# Patient Record
Sex: MALE | Race: WHITE | Employment: OTHER | URBAN - NONMETROPOLITAN AREA
[De-identification: names, ages, dates, MRNs, and addresses within clinical notes are randomized per-mention and may not be internally consistent; named-entity substitution may affect disease eponyms.]

---

## 2019-06-28 ENCOUNTER — OFFICE VISIT (OUTPATIENT)
Dept: UROLOGY | Age: 64
End: 2019-06-28
Payer: COMMERCIAL

## 2019-06-28 VITALS
WEIGHT: 242.3 LBS | BODY MASS INDEX: 33.92 KG/M2 | SYSTOLIC BLOOD PRESSURE: 110 MMHG | DIASTOLIC BLOOD PRESSURE: 68 MMHG | HEIGHT: 71 IN

## 2019-06-28 DIAGNOSIS — N40.0 BPH WITHOUT OBSTRUCTION/LOWER URINARY TRACT SYMPTOMS: ICD-10-CM

## 2019-06-28 DIAGNOSIS — R36.1 HEMATOSPERMIA: Primary | ICD-10-CM

## 2019-06-28 LAB
BILIRUBIN URINE: NEGATIVE
BLOOD URINE, POC: NEGATIVE
CHARACTER, URINE: CLEAR
COLOR, URINE: YELLOW
GLUCOSE URINE: NEGATIVE MG/DL
KETONES, URINE: NEGATIVE
LEUKOCYTE CLUMPS, URINE: NEGATIVE
NITRITE, URINE: NEGATIVE
PH, URINE: 5 (ref 5–9)
PROTEIN, URINE: NEGATIVE MG/DL
SPECIFIC GRAVITY, URINE: 1.02 (ref 1–1.03)
UROBILINOGEN, URINE: 0.2 EU/DL (ref 0–1)

## 2019-06-28 PROCEDURE — 3017F COLORECTAL CA SCREEN DOC REV: CPT | Performed by: NURSE PRACTITIONER

## 2019-06-28 PROCEDURE — 99203 OFFICE O/P NEW LOW 30 MIN: CPT | Performed by: NURSE PRACTITIONER

## 2019-06-28 PROCEDURE — G8417 CALC BMI ABV UP PARAM F/U: HCPCS | Performed by: NURSE PRACTITIONER

## 2019-06-28 PROCEDURE — 81003 URINALYSIS AUTO W/O SCOPE: CPT | Performed by: NURSE PRACTITIONER

## 2019-06-28 PROCEDURE — 1036F TOBACCO NON-USER: CPT | Performed by: NURSE PRACTITIONER

## 2019-06-28 PROCEDURE — G8427 DOCREV CUR MEDS BY ELIG CLIN: HCPCS | Performed by: NURSE PRACTITIONER

## 2019-06-28 RX ORDER — ALLOPURINOL 300 MG/1
TABLET ORAL
COMMUNITY
Start: 2019-06-26

## 2019-06-28 NOTE — PROGRESS NOTES
[address] [place] [address] Shona Ramos  Dept: [phone]  Loc: [phone]  Visit Date: 6/28/2019      HPI:     Kelly Gregory is a 59 y.o. with past medical history of hypertension, diabetes, arthritis who present today for blood in semen. He reports noticing blood in his semen approximately 2 to 3 months ago, this has resolved. He reported a slight discomfort with ejaculation. He denied any dull, achy pain. This is never happened before, first occurrence. He described as more of a dark, colored semen. He reports that he does ride bicycle, was wondering if this potentially could have caused the blood in his semen. PSA testing has been performed in the past, most recently was 0.81 in 10/2018. No family history of prostate cancer  He denies weak stream, frequency, urgency, incomplete bladder emptying. He comes in today by himself. Hx is obtained from the patient and medical record. Current Outpatient Medications   Medication Sig Dispense Refill    allopurinol (ZYLOPRIM) 300 MG tablet       lisinopril (PRINIVIL;ZESTRIL) 10 MG tablet Take 10 mg by mouth daily      nadolol (CORGARD) 40 MG tablet Take 40 mg by mouth daily      indapamide (LOZOL) 1.25 MG tablet Take 1.25 mg by mouth every morning      metFORMIN (GLUCOPHAGE) 500 MG tablet Take 500 mg by mouth daily (with breakfast)      meloxicam (MOBIC) 7.5 MG tablet Take 7.5 mg by mouth daily      aspirin 81 MG tablet Take 81 mg by mouth daily      sildenafil (REVATIO) 20 MG tablet Take 20-40 mg by mouth as needed      glucose monitoring kit (FREESTYLE) monitoring kit Use as directed 1 kit 0    FREESTYLE LANCETS MISC 1 each by Does not apply route 4 times daily 120 each 3    glucose blood VI test strips (FREESTYLE TEST STRIPS) strip 1 each by In Vitro route 4 times daily As needed.  120 each 3    calcium carbonate (OSCAL) 500 MG TABS tablet Take 500 mg by mouth daily  Cholecalciferol (VITAMIN D3) 96673 UNITS CAPS Take 1 capsule by mouth three times a week      magnesium oxide (MAG-OX) 400 MG tablet Take 400 mg by mouth daily       No current facility-administered medications for this visit. Past Medical History  Sruthi Hernandez  has a past medical history of Arthritis, Diabetes mellitus ([address] and Hypertension. Past Surgical History  The patient  has a past surgical history that includes Tonsillectomy (2000); Carpal tunnel release (Bilateral, 2005, 2010); Knee arthroscopy (Right, 08/2016); Adenoidectomy (2000); Nasal septum surgery (2000); Uvulectomy (2000); [place] tooth extraction; and Colonoscopy. Family History  This patient's family history includes Diabetes in his father and mother; Heart Attack in his father and sister; High Blood Pressure in his brother, father, mother, sister, sister, and sister; High Cholesterol in his father and mother; Stroke in his brother, father, and mother. Social History  Sruthi Hernandez  reports that he quit smoking about 28 years ago. He quit smokeless tobacco use about 4 years ago. His smokeless tobacco use included snuff and chew. He reports that he drinks about 21.0 oz of alcohol per week. He reports that he does not use drugs. Subjective:     Review of Systems  No problems with ears, nose or throat. No problems with eyes. No chest pain, shortness of breath, abdominal pain, extremity pain or weakness, and no neurological deficits. No rashes.  symptoms per HPI. The remainder of the review of symptoms is negative. Objective:     PE:   Vitals:    06/28/19 0902   BP: 110/68   Weight: 242 lb 4.8 oz (109.9 kg)   Height: 5' 11\" (1.803 m)       Constitutional: Alert and oriented times 3, no acute distress and cooperative to examination with appropriate mood and affect. HENT:   Head:   Normocephalic and atraumatic. Mouth/Throat:   Mucous membranes are normal.   EOM are normal. No scleral icterus. PERRLA.    Neck:   Supple, symmetrical, trachea midline  Pulmonary/Chest:  Chest symmetric with normal A/P diameter,   Normal respiratory rate and rhthym. No use of accessory muscles. Abdominal:   Soft. No tenderness. Bowel sounds present. Musculoskeletal:  Normal range of motion. No edema or tenderness of lower extremities. Extremities: No cyanosis, clubbing, or edema present. Neurological:   Alert and oriented. No cranial nerve deficit. [facility]:   Normal mood and affect. Rectal: mild enlarged prostate, 30 cc, normal tone, no masses, nodules, induration palpated, smooth and freely movable    Labs   Urine dip demonstrates   No results found for this visit on 06/28/19. Patients recent PSA values are as follows  No results found for: PSA, PSADIA     Recent BUN/Creatinine:  Lab Results   Component Value Date    BUN 16 11/10/2016    CREATININE 0.8 11/10/2016       Radiology  No recent imaging       Assessment & Plan:     Hematospermia  BPH w/o LUTS    Luis Fernando Mireles is a 59year old male with Hematospermia. He reports onset of hematospermia approximately 2 to 3 months ago, this has since resolved. Discussed potential causes of hematospermia, are typically benign, associated with BPH changes. If symptoms do present again, recommended treatment with antibiotics for potential prostatitis. PSA screening has been performed, 0.81 in 10/2018, recommended continued yearly PSAs with his PCP. TIFFANY normal, 30 cc. Return if symptoms worsen or fail to improve.     HERMELINDA Santana  Urology

## 2021-11-24 ENCOUNTER — NURSE ONLY (OUTPATIENT)
Dept: FAMILY MEDICINE CLINIC | Age: 66
End: 2021-11-24
Payer: MEDICARE

## 2021-11-24 DIAGNOSIS — R43.2 LOSS OF TASTE: Primary | ICD-10-CM

## 2021-11-24 LAB
Lab: NORMAL
PERFORMING INSTRUMENT: NORMAL
QC PASS/FAIL: NORMAL
SARS-COV-2, POC: NORMAL

## 2021-11-24 PROCEDURE — 87426 SARSCOV CORONAVIRUS AG IA: CPT | Performed by: FAMILY MEDICINE

## 2021-11-26 LAB
SARS-COV-2: NOT DETECTED
SOURCE: NORMAL

## 2025-07-09 ENCOUNTER — HOSPITAL ENCOUNTER (EMERGENCY)
Age: 70
Discharge: HOME | End: 2025-07-09
Payer: MEDICARE

## 2025-07-09 VITALS
HEIGHT: 71 IN | WEIGHT: 230 LBS | BODY MASS INDEX: 32.2 KG/M2 | OXYGEN SATURATION: 100 % | HEART RATE: 89 BPM | RESPIRATION RATE: 16 BRPM | DIASTOLIC BLOOD PRESSURE: 66 MMHG | TEMPERATURE: 98.4 F | SYSTOLIC BLOOD PRESSURE: 137 MMHG

## 2025-07-09 DIAGNOSIS — S70.361A TICK BITE OF RIGHT THIGH, INITIAL ENCOUNTER: Primary | ICD-10-CM
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
## 2025-07-09 PROCEDURE — 99203 OFFICE O/P NEW LOW 30 MIN: CPT | Performed by: NURSE PRACTITIONER

## 2025-07-09 PROCEDURE — 99203 OFFICE O/P NEW LOW 30 MIN: CPT

## 2025-07-09 RX ORDER — DOXYCYCLINE HYCLATE 100 MG
200 TABLET ORAL ONCE
Qty: 2 TABLET | Refills: 0 | Status: SHIPPED | OUTPATIENT
Start: 2025-07-09 | End: 2025-07-09

## 2025-07-09 ASSESSMENT — ENCOUNTER SYMPTOMS
VOMITING: 0
DIARRHEA: 0
SHORTNESS OF BREATH: 0
COUGH: 0
NAUSEA: 0
SORE THROAT: 0

## 2025-07-09 ASSESSMENT — PAIN - FUNCTIONAL ASSESSMENT: PAIN_FUNCTIONAL_ASSESSMENT: NONE - DENIES PAIN

## 2025-07-09 NOTE — ED TRIAGE NOTES
To room 3 c/o \" Removed tick from right  thigh Friday. I got real sick about 1 month after the bit and was treated for lymes disease in approx 2014\"  Denies s/s \" I feel fine but I did before too\"

## 2025-07-09 NOTE — DISCHARGE INSTRUCTIONS
Take one-time dose of doxycycline.    Monitor bites for increased redness, or swelling.    Follow-up with primary care provider as needed, or for high fever, chills, body aches, or signs of infection.

## 2025-07-09 NOTE — ED PROVIDER NOTES
[facility] URGENT CARE  UrgentCare Encounter      CHIEFCOMPLAINT       Chief Complaint   Patient presents with    Insect Bite     Right thigh = pt removed tick 7/4/25 and is concerned because he got very ill from a tick bite before and treated for lymes disease in [place]       Nurses Notes reviewed and I agree except as noted in the HPI.  HISTORY OF PRESENT ILLNESS   Kal Castellanos is a 70 y.o. male who presents to urgent care for complaints of a tick bite to his right lateral lower thigh.  States he discovered the tick on the evening of 7/4/2025.  He is unsure when the tick attached itself, but states [place].  States the tick was small, and not engorged when he removed it.  He denies any fever, chills, or bodyaches.  States he is concerned because in 2014, he was treated for Lyme's disease.    REVIEW OF SYSTEMS     Review of Systems   Constitutional:  Negative for appetite change, chills, fatigue and fever.   HENT:  Negative for congestion and sore throat.    Respiratory:  Negative for cough and shortness of breath.    Cardiovascular:  Negative for chest pain.   Gastrointestinal:  Negative for diarrhea, nausea and vomiting.   Musculoskeletal:  Negative for myalgias.   Skin:         Tick bite to right leg   Neurological:  Negative for dizziness and headaches.       PAST MEDICAL HISTORY         Diagnosis Date    Arthritis     Diabetes mellitus (HCC)     Hypertension        SURGICAL HISTORY     Patient  has a past surgical history that includes Tonsillectomy (2000); Carpal tunnel release (Bilateral, 2005, 2010); Knee arthroscopy (Right, 08/2016); Adenoidectomy (2000); Nasal septum surgery (2000); Uvulectomy (2000); [place] tooth extraction; and Colonoscopy.    CURRENT MEDICATIONS       Discharge Medication List as of 7/9/2025 10:21 AM        CONTINUE these medications which have NOT CHANGED    Details   Rivaroxaban (XARELTO PO) Take by mouthHistorical Med      allopurinol (ZYLOPRIM)